# Patient Record
Sex: MALE | URBAN - METROPOLITAN AREA
[De-identification: names, ages, dates, MRNs, and addresses within clinical notes are randomized per-mention and may not be internally consistent; named-entity substitution may affect disease eponyms.]

---

## 2021-02-22 ENCOUNTER — OFFICE VISIT (OUTPATIENT)
Dept: URBAN - METROPOLITAN AREA CLINIC 86 | Facility: CLINIC | Age: 81
End: 2021-02-22
Payer: MEDICARE

## 2021-02-22 DIAGNOSIS — H35.379 PUCKERING OF MACULA: ICD-10-CM

## 2021-02-22 PROCEDURE — 67028 INJECTION EYE DRUG: CPT | Performed by: OPHTHALMOLOGY

## 2021-02-22 PROCEDURE — 99214 OFFICE O/P EST MOD 30 MIN: CPT | Performed by: OPHTHALMOLOGY

## 2021-02-22 PROCEDURE — 92134 CPTRZ OPH DX IMG PST SGM RTA: CPT | Performed by: OPHTHALMOLOGY

## 2021-02-22 ASSESSMENT — INTRAOCULAR PRESSURE
OS: 16
OD: 18

## 2021-02-22 NOTE — IMPRESSION/PLAN
Impression: Type 2 diab w moderate nonprlf diab rtnop w macular edema, bilateral: Q73.8806 Bilateral.

OCT:
OD: ERM, ME
OS: ERM, ME Plan: The diagnosis and prognosis of diabetic macular edema, as well as the risks and benefits of various treatment options including focal/grid laser, steroids, Lucentis, Eylea and Avastin; along with the alternatives of observation or participation in a clinical trial, were discussed at length. The patient understands that treatment may not improve vision, but should reduce the risk of further visual loss. Given worsening of vision and exam, pt elects to proceed with Avastin OD , observe OS.  

RTC 1 month Avastin OD #2/3

## 2021-02-22 NOTE — IMPRESSION/PLAN
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 Plan: Examination and OCT reveals an ERM which is distorting the macular contour. The diagnosis, natural history, and prognosis of epiretinal membranes, as well as the risks and benefits of vitrectomy with membrane peeling versus observation were discussed at length. Given the patient's current visual acuity and minimal hindrance on activities of daily living, observation was recommended at this time.

## 2021-03-26 ENCOUNTER — PROCEDURE (OUTPATIENT)
Dept: URBAN - METROPOLITAN AREA CLINIC 86 | Facility: CLINIC | Age: 81
End: 2021-03-26
Payer: MEDICARE

## 2021-03-26 PROCEDURE — 67028 INJECTION EYE DRUG: CPT | Performed by: OPHTHALMOLOGY

## 2021-03-26 ASSESSMENT — INTRAOCULAR PRESSURE
OD: 12
OS: 14

## 2021-05-21 ENCOUNTER — PROCEDURE (OUTPATIENT)
Dept: URBAN - METROPOLITAN AREA CLINIC 86 | Facility: CLINIC | Age: 81
End: 2021-05-21
Payer: MEDICARE

## 2021-05-21 DIAGNOSIS — E11.3313 TYPE 2 DIABETES MELLITUS WITH MODERATE NONPROLIFERATIVE DIABETIC RETINOPATHY WITH MACULAR EDEMA, BILATERAL: Primary | ICD-10-CM

## 2021-05-21 PROCEDURE — 67028 INJECTION EYE DRUG: CPT | Performed by: OPHTHALMOLOGY

## 2021-05-21 ASSESSMENT — INTRAOCULAR PRESSURE
OD: 18
OS: 15